# Patient Record
Sex: FEMALE | Race: BLACK OR AFRICAN AMERICAN | NOT HISPANIC OR LATINO | URBAN - METROPOLITAN AREA
[De-identification: names, ages, dates, MRNs, and addresses within clinical notes are randomized per-mention and may not be internally consistent; named-entity substitution may affect disease eponyms.]

---

## 2018-11-07 ENCOUNTER — APPOINTMENT (RX ONLY)
Dept: URBAN - METROPOLITAN AREA CLINIC 12 | Facility: CLINIC | Age: 63
Setting detail: DERMATOLOGY
End: 2018-11-07

## 2018-11-07 DIAGNOSIS — Z41.1 ENCOUNTER FOR COSMETIC SURGERY: ICD-10-CM

## 2018-11-07 PROBLEM — I82.409 ACUTE EMBOLISM AND THROMBOSIS OF UNSPECIFIED DEEP VEINS OF UNSPECIFIED LOWER EXTREMITY: Status: ACTIVE | Noted: 2018-11-07

## 2018-11-07 PROBLEM — Z92.3 PERSONAL HISTORY OF IRRADIATION: Status: ACTIVE | Noted: 2018-11-07

## 2018-11-07 PROBLEM — F32.9 MAJOR DEPRESSIVE DISORDER, SINGLE EPISODE, UNSPECIFIED: Status: ACTIVE | Noted: 2018-11-07

## 2018-11-07 PROBLEM — J45.909 UNSPECIFIED ASTHMA, UNCOMPLICATED: Status: ACTIVE | Noted: 2018-11-07

## 2018-11-07 PROBLEM — C49.5: Status: ACTIVE | Noted: 2018-11-07

## 2018-11-07 PROBLEM — I10 ESSENTIAL (PRIMARY) HYPERTENSION: Status: ACTIVE | Noted: 2018-11-07

## 2018-11-07 PROBLEM — K21.9 GASTRO-ESOPHAGEAL REFLUX DISEASE WITHOUT ESOPHAGITIS: Status: ACTIVE | Noted: 2018-11-07

## 2018-11-07 PROCEDURE — ? PATIENT SPECIFIC COUNSELING

## 2018-11-07 PROCEDURE — ? COUNSELING - DVT RISK ASSESSMENT

## 2018-11-07 PROCEDURE — ?

## 2018-11-07 PROCEDURE — 99203 OFFICE O/P NEW LOW 30 MIN: CPT

## 2018-11-07 PROCEDURE — ? PHOTOS OBTAINED

## 2018-11-07 PROCEDURE — ? PRE-OP WORKLIST

## 2018-11-07 PROCEDURE — ? COUNSELING EXCISION - MALIGNANT

## 2018-11-07 NOTE — PROCEDURE: COUNSELING EXCISION - MALIGNANT
Add Postop Global No-Charge Code (61012)?: no
Date Scheduled For Excision (Optional): TBD
X Size Of Lesion In Cm (Optional): 3.3
Anatomic Location From Referring Provider: Right lower anterior abdominal wall in the pelvis
Size Of Lesion: 4
Detail Level: Detailed
Other Immunosuppression Factors: Femara 2.5mg QD

## 2018-11-07 NOTE — HPI: SOFT TISSUE SARCOMA
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
Name Of Oncologist Doctor?: Dr. Jackson

## 2018-11-07 NOTE — PROCEDURE: PATIENT SPECIFIC COUNSELING
Other (Free Text): Dr. Diaz examined the patient’s abdomen and noted a scar from the patient’s previous . He noted there was no additional scar in that area, and the patient confirmed that the surgery from her previous sarcoma removal in  by Dr. Juan Pablo Reese at [facility] utilized her previous  scar. She denies any plastic surgeon involved in this surgery or any mesh placed within the surgical site as per her knowledge. 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 \\Brandon Diaz then examined the patient for a hernia, and reported a negative result. \\Brandon Diaz counseled the patient that if Dr. Jackson does not excise any muscle or fascia when removing her tumor, Dr. Diaz will utilize the folds on her abdomen to close the wound, similar to a abdominoplasty. He explained this scenario would require a 2-3 week recovery time. He then counseled the patient that if Dr. Jackson removes patient’s fascia and muscle, then the resulting surgical hernia would require a collagen sheet, created from de-epithelialized pig skin, followed by a mesh sheet on top for wound reconstruction. He informed the patient the collagen sheet is easily accepted by human bodies, and does not have the risk of attaching to the small bowel. He also explained placing the mesh on top of the collagen sheet reduces the risk of the mesh bonding to the bowels. Dr. Diaz counseled the patient this procedure tends to be very painful during the recovery period, so he will apply a long-lasting numbing agent during the surgery to help ease the discomfort in the first three days. He counseled the patient he cannot 100% predict how long the patient will be required to stay in the hospital for recovery, but most likely the patient will stay 2 nights. He explained that in order to reduce the risk of bleeding after surgery, he will wean the patient off her Pradaxa pills and begin her on the anti-coagulant Lovenox injections as a prophylactic for deep vein thrombosis. The patient verbalized understanding to all discussed.
Detail Level: Simple

## 2018-11-07 NOTE — PROCEDURE: PRE-OP WORKLIST
Date Of Evaluation: 11/06/2018
Surgeon: Dr. Kumar Diaz
Coordination With:: Dr. Jackson
Detail Level: Simple
Surgery Scheduled: Abdominal wall reconstruction with mesh and ADM
Photos Taken?: yes
Date Of Surgery: TBD

## 2018-11-07 NOTE — PROCEDURE: PHOTOS OBTAINED
Follow-Up For Additional Photos?: no
Detail Level: Detailed
Photographed Locations: Photos were obtained of the surgical site(s).

## 2018-11-09 VITALS — WEIGHT: 205 LBS | HEIGHT: 64 IN

## 2018-12-05 ENCOUNTER — APPOINTMENT (RX ONLY)
Dept: URBAN - METROPOLITAN AREA CLINIC 12 | Facility: CLINIC | Age: 63
Setting detail: DERMATOLOGY
End: 2018-12-05

## 2018-12-05 DIAGNOSIS — Z41.1 ENCOUNTER FOR COSMETIC SURGERY: ICD-10-CM

## 2018-12-05 PROBLEM — C49.5: Status: ACTIVE | Noted: 2018-12-05

## 2018-12-05 PROCEDURE — ? COUNSELING EXCISION - MALIGNANT

## 2018-12-05 PROCEDURE — ?

## 2018-12-05 PROCEDURE — ? COUNSELING - DVT RISK ASSESSMENT

## 2018-12-05 PROCEDURE — ? PATIENT SPECIFIC COUNSELING

## 2018-12-05 PROCEDURE — 99213 OFFICE O/P EST LOW 20 MIN: CPT

## 2018-12-05 NOTE — PROCEDURE: PATIENT SPECIFIC COUNSELING
Other (Free Text): Patient presents for a re talk regarding her surgery. Dr. Diaz explained that he talked to Dr. Jackson and he expressed that he will not have to go into the muscle portion of her abdomen, however , he will need to add mesh to fill up the missing facia. Dr. Diaz explained that she will be in a considerable amount of pain, as well as she will go home with drains. Dr. Diaz also explained that she will be in the hospital for at least 2-3 nights.  Dr. Diaz carefully explained that there is little to no affect with using the mesh. Dr. Diaz explained that the concern with using the mesh is very low. Dr. Diaz explained that he does use a very careful approach when placing the mesh, including an antibiotic wash before placing the mesh. Dr. Diaz explained that in the end, she will have inconsistencies in her contour. \\n \\Brandon Diaz explained that all of his work will be on the right lower quadrant of her abdomen. With no effort to the rest of her abdomen.  However Dr. Diaz explained that he can aesthetically make her abdomen look better by performing a panniculectomy. Which would involve excising the extra skin ( tire ) around her lower abdomen, close her up and later do liposuction to get rid of the fat n her upper abdomen. Dr. Diaz explained that he cannot safely perform the panniculctomy and liposuction together as it is not safe. Dr. Diaz expressed that insurance will not cover a tummy tuck or contouring of her abdomen as the insurance company will only cover what is medically necessary.  Dr. Diaz explained that if she does loose weight, after her surgery, she will have loose skin on her upper abdomen. Dr. Diaz explained that post surgery she will need to take it easy, no heavy lifting, and nothing that is going to raise her blood pressure and heart rate.\\nPatient verbalized understanding.
Detail Level: Zone

## 2018-12-12 ENCOUNTER — APPOINTMENT (RX ONLY)
Dept: URBAN - METROPOLITAN AREA CLINIC 12 | Facility: CLINIC | Age: 63
Setting detail: DERMATOLOGY
End: 2018-12-12

## 2018-12-12 DIAGNOSIS — Z41.1 ENCOUNTER FOR COSMETIC SURGERY: ICD-10-CM

## 2018-12-12 PROCEDURE — ? CONSULTATION - ABDOMINOPLASTY

## 2018-12-12 PROCEDURE — 99024 POSTOP FOLLOW-UP VISIT: CPT

## 2018-12-12 PROCEDURE — ? PRE-OP WORKLIST

## 2018-12-12 PROCEDURE — ? NO CHARGE PRE-OP VISIT

## 2018-12-12 ASSESSMENT — LOCATION ZONE DERM: LOCATION ZONE: TRUNK

## 2018-12-12 ASSESSMENT — LOCATION SIMPLE DESCRIPTION DERM: LOCATION SIMPLE: ABDOMEN

## 2018-12-12 ASSESSMENT — LOCATION DETAILED DESCRIPTION DERM: LOCATION DETAILED: PERIUMBILICAL SKIN

## 2018-12-12 NOTE — PROCEDURE: NO CHARGE PRE-OP VISIT
For Tracking Purposes Only. Note 90432 Is Retired Code. Use 54337 Or 32924?: [id number]
Detail Level: Detailed

## 2018-12-26 ENCOUNTER — APPOINTMENT (RX ONLY)
Dept: URBAN - METROPOLITAN AREA CLINIC 12 | Facility: CLINIC | Age: 63
Setting detail: DERMATOLOGY
End: 2018-12-26

## 2018-12-26 DIAGNOSIS — Z48.89 ENCOUNTER FOR OTHER SPECIFIED SURGICAL AFTERCARE: ICD-10-CM

## 2018-12-26 PROCEDURE — ? COUNSELING - POST-OP CHECK, PANNICULECTOMY

## 2018-12-26 PROCEDURE — ? COUNSELING - POST-OP CHECK, COMPLEX WOUND RECONSTRUCTION

## 2018-12-26 PROCEDURE — ? POST-OP CHECK

## 2018-12-26 PROCEDURE — 99024 POSTOP FOLLOW-UP VISIT: CPT

## 2018-12-26 PROCEDURE — ? PATIENT SPECIFIC COUNSELING

## 2018-12-26 ASSESSMENT — LOCATION DETAILED DESCRIPTION DERM
LOCATION DETAILED: RIGHT ANTERIOR PROXIMAL THIGH
LOCATION DETAILED: PERIUMBILICAL SKIN
LOCATION DETAILED: LEFT LATERAL ABDOMEN

## 2018-12-26 ASSESSMENT — LOCATION ZONE DERM
LOCATION ZONE: TRUNK
LOCATION ZONE: LEG

## 2018-12-26 ASSESSMENT — LOCATION SIMPLE DESCRIPTION DERM
LOCATION SIMPLE: ABDOMEN
LOCATION SIMPLE: RIGHT THIGH

## 2018-12-26 NOTE — PROCEDURE: PATIENT SPECIFIC COUNSELING
Detail Level: Simple
Other (Free Text): Dr. Diaz reassured the patient she is healing very well and has no unusual edema, hematoma, seroma, or sign of infection. He counseled her the reason her right abdominal side has more drainage than the left abdominal side is due to a small tear in the duodenum being created when Dr. Jackson removed her tumor. He explained Dr. Jackson sutured the hole shut, but the fluid from this area was released and thus is currently being drained out on her right abdominal side. Due to this, he educated the patient she should keep this right drain in for at least another week. He instructed the patient to listen to her body for the next week as far as how much pain medication she will need, and she is to cover both her right and left abdominal sides with gauze and brown paper tape, which she was furnished with, each day until she returns next week to have the drain on her right side removed. The patient verbalized understanding to all discussed and is to RTC in 1 week.

## 2018-12-26 NOTE — PROCEDURE: POST-OP CHECK
Detail Level: Detailed
Wound Evaluated By: Dr. Kumar Diaz
Add Postop Global No-Charge Code (08044)?: yes

## 2019-01-02 ENCOUNTER — APPOINTMENT (RX ONLY)
Dept: URBAN - METROPOLITAN AREA CLINIC 12 | Facility: CLINIC | Age: 64
Setting detail: DERMATOLOGY
End: 2019-01-02

## 2019-01-02 DIAGNOSIS — Z48.89 ENCOUNTER FOR OTHER SPECIFIED SURGICAL AFTERCARE: ICD-10-CM

## 2019-01-02 PROCEDURE — ? POST-OP CHECK

## 2019-01-02 PROCEDURE — ? SEPARATE AND IDENTIFIABLE DOCUMENTATION

## 2019-01-02 PROCEDURE — 99024 POSTOP FOLLOW-UP VISIT: CPT

## 2019-01-02 PROCEDURE — ? PATIENT SPECIFIC COUNSELING

## 2019-01-02 PROCEDURE — ? SET GLOBAL PERIOD

## 2019-01-02 PROCEDURE — ? COUNSELING - POST-OP CHECK, PANNICULECTOMY

## 2019-01-02 ASSESSMENT — LOCATION ZONE DERM
LOCATION ZONE: TRUNK
LOCATION ZONE: LEG

## 2019-01-02 ASSESSMENT — LOCATION SIMPLE DESCRIPTION DERM
LOCATION SIMPLE: RIGHT THIGH
LOCATION SIMPLE: ABDOMEN

## 2019-01-02 ASSESSMENT — LOCATION DETAILED DESCRIPTION DERM
LOCATION DETAILED: RIGHT ANTERIOR PROXIMAL THIGH
LOCATION DETAILED: PERIUMBILICAL SKIN

## 2019-01-02 NOTE — PROCEDURE: PATIENT SPECIFIC COUNSELING
Other (Free Text): Discussed with patient that her incisions are healing very well.\\nPatient will start taping incisions for 3 months.\\nExplained to patient that her drain output has not changed much since she’s had it in for 2 weeks. Discussed with patient that if the drain is left in too long, it can cause an infection. \\Nahidso explained to patient to start weening herself off the narcotics during the day, and only take it at night. A prescription for Tramadol was written today. \\nAdvised patient to take it easy if it’s causing pain.
Detail Level: Simple

## 2019-01-02 NOTE — PROCEDURE: POST-OP CHECK
Wound Evaluated By: Dr. Kumar Diaz
Detail Level: Detailed
Add Postop Global No-Charge Code (54810)?: yes

## 2019-01-08 ENCOUNTER — APPOINTMENT (RX ONLY)
Dept: URBAN - METROPOLITAN AREA CLINIC 12 | Facility: CLINIC | Age: 64
Setting detail: DERMATOLOGY
End: 2019-01-08

## 2019-01-08 DIAGNOSIS — Z48.89 ENCOUNTER FOR OTHER SPECIFIED SURGICAL AFTERCARE: ICD-10-CM

## 2019-01-08 PROCEDURE — ? SEPARATE AND IDENTIFIABLE DOCUMENTATION

## 2019-01-08 PROCEDURE — ? COUNSELING - POST-OP CHECK, PANNICULECTOMY

## 2019-01-08 PROCEDURE — ? SET GLOBAL PERIOD

## 2019-01-08 PROCEDURE — 99024 POSTOP FOLLOW-UP VISIT: CPT

## 2019-01-08 PROCEDURE — ? POST-OP CHECK

## 2019-01-08 PROCEDURE — ? PATIENT SPECIFIC COUNSELING

## 2019-01-08 PROCEDURE — ? PRESCRIPTION

## 2019-01-08 RX ORDER — METHOCARBAMOL 500 MG/1
TABLET, FILM COATED ORAL
Qty: 40 | Refills: 0 | Status: ACTIVE

## 2019-01-08 ASSESSMENT — LOCATION DETAILED DESCRIPTION DERM
LOCATION DETAILED: PERIUMBILICAL SKIN
LOCATION DETAILED: RIGHT ANTERIOR PROXIMAL THIGH

## 2019-01-08 ASSESSMENT — LOCATION SIMPLE DESCRIPTION DERM
LOCATION SIMPLE: ABDOMEN
LOCATION SIMPLE: RIGHT THIGH

## 2019-01-08 ASSESSMENT — LOCATION ZONE DERM
LOCATION ZONE: TRUNK
LOCATION ZONE: LEG

## 2019-01-08 NOTE — PROCEDURE: PATIENT SPECIFIC COUNSELING
Detail Level: Simple
Other (Free Text): Patient presents s/p abdominal reconstruction. Patient states she’s noticed significant swelling around her vaginal area. Dr. Diaz examined her incision sites and explained that her incisions look great and are healing well . Dr. Diaz addressed the swelling around her pelvic area. Patient was made aware that the swelling is normal and is due to the drilling holes into her pelvic bone to she down the mesh. Dr. Diaz explained that the amount  of discomfort she having is normal . Dr. Diaz informed patient that she should discontinue wearing the belly binder and start wearing spanks that focuses mainly on her lower abdomen to help elevate pain and diminish her swelling. Patient was advised to continue taping her incisions. And was informed that she may start driving as long as she not under the influence of narcotic medications. Patient was instructed to start moving her body around, however, she should listen to her body. Patient was given a prescription refill for Robaxin today. PT to follow up in 2 weeks

## 2019-01-08 NOTE — PROCEDURE: POST-OP CHECK
Add Postop Global No-Charge Code (30416)?: yes
Detail Level: Detailed
Wound Evaluated By: Dr. Kumar Diaz

## 2019-01-22 ENCOUNTER — APPOINTMENT (RX ONLY)
Dept: URBAN - METROPOLITAN AREA CLINIC 12 | Facility: CLINIC | Age: 64
Setting detail: DERMATOLOGY
End: 2019-01-22

## 2019-01-22 DIAGNOSIS — Z48.89 ENCOUNTER FOR OTHER SPECIFIED SURGICAL AFTERCARE: ICD-10-CM

## 2019-01-22 PROCEDURE — ? SEPARATE AND IDENTIFIABLE DOCUMENTATION

## 2019-01-22 PROCEDURE — ? PATIENT SPECIFIC COUNSELING

## 2019-01-22 PROCEDURE — ? COUNSELING - POST-OP CHECK, PANNICULECTOMY

## 2019-01-22 PROCEDURE — ? SET GLOBAL PERIOD

## 2019-01-22 PROCEDURE — ? POST-OP CHECK

## 2019-01-22 PROCEDURE — 99024 POSTOP FOLLOW-UP VISIT: CPT

## 2019-01-22 PROCEDURE — ? COUNSELING - POST-OP CHECK, COMPLEX WOUND RECONSTRUCTION

## 2019-01-22 ASSESSMENT — LOCATION SIMPLE DESCRIPTION DERM
LOCATION SIMPLE: RIGHT THIGH
LOCATION SIMPLE: ABDOMEN

## 2019-01-22 ASSESSMENT — LOCATION ZONE DERM
LOCATION ZONE: TRUNK
LOCATION ZONE: LEG

## 2019-01-22 ASSESSMENT — LOCATION DETAILED DESCRIPTION DERM
LOCATION DETAILED: PERIUMBILICAL SKIN
LOCATION DETAILED: RIGHT LATERAL ABDOMEN
LOCATION DETAILED: RIGHT ANTERIOR PROXIMAL THIGH

## 2019-01-22 NOTE — PROCEDURE: PATIENT SPECIFIC COUNSELING
Other (Free Text): Dr. Diaz reassured the patient her abdominal incisions are healing well with an expected amount of scar tissue and edema on the right side. He instructed the patient to continue taping her scars with the scar fading tape for at least 3 months, and she was furnished with another roll of tape. He also instructed her to wait to engage her deep core muscles when doing Pilates exercises, as her abdominal muscles still need some time to recover before strength training. The patient and Dr. Diaz agreed the patient should work with a physical therapist to stretch and perform low-grade core strength exercises, and Dr. Diaz wrote her a referral. The patient was reassured walking and exercising her upper body is okay at this time. The patient verbalized understanding to all discussed and she is to RTC following her CT scan next month, which Dr. Diaz informed her he will evaluate via the [facility] database.
Detail Level: Simple

## 2019-01-22 NOTE — PROCEDURE: SET GLOBAL PERIOD
Date Of Surgery (Mm/Dd/Yyyy): 12/18/2018
Detail Level: Simple
Surgery Global Period: 90
Surgery Global Period: 0
Other Surgery Performed: Panniculectomy

## 2019-01-22 NOTE — PROCEDURE: POST-OP CHECK
Add Postop Global No-Charge Code (99653)?: yes
Wound Evaluated By: Dr. Kumar Diaz
Detail Level: Detailed
Detail Level: Simple

## 2019-02-12 ENCOUNTER — APPOINTMENT (RX ONLY)
Dept: URBAN - METROPOLITAN AREA CLINIC 12 | Facility: CLINIC | Age: 64
Setting detail: DERMATOLOGY
End: 2019-02-12

## 2019-02-12 DIAGNOSIS — Z48.89 ENCOUNTER FOR OTHER SPECIFIED SURGICAL AFTERCARE: ICD-10-CM

## 2019-02-12 PROCEDURE — ? PATIENT SPECIFIC COUNSELING

## 2019-02-12 PROCEDURE — ? COUNSELING - POST-OP CHECK, COMPLEX WOUND RECONSTRUCTION

## 2019-02-12 PROCEDURE — 99024 POSTOP FOLLOW-UP VISIT: CPT

## 2019-02-12 PROCEDURE — ? COUNSELING - POST-OP CHECK, PANNICULECTOMY

## 2019-02-12 PROCEDURE — ? SEPARATE AND IDENTIFIABLE DOCUMENTATION

## 2019-02-12 PROCEDURE — ? POST-OP CHECK

## 2019-02-12 ASSESSMENT — LOCATION DETAILED DESCRIPTION DERM
LOCATION DETAILED: RIGHT LATERAL ABDOMEN
LOCATION DETAILED: RIGHT ANTERIOR PROXIMAL THIGH
LOCATION DETAILED: PERIUMBILICAL SKIN

## 2019-02-12 ASSESSMENT — LOCATION SIMPLE DESCRIPTION DERM
LOCATION SIMPLE: RIGHT THIGH
LOCATION SIMPLE: ABDOMEN

## 2019-02-12 ASSESSMENT — LOCATION ZONE DERM
LOCATION ZONE: TRUNK
LOCATION ZONE: LEG

## 2019-02-12 NOTE — PROCEDURE: PATIENT SPECIFIC COUNSELING
Other (Free Text): CT guided seroma aspiration recommended. They are likely seeing this on the CT scan. \\nPatient was going to start chemo on Friday. Dr. Diaz will call patient’s oncologist to discuss.\\n\\nPatient advised to follow up in a couple months when she is feeling good while doing chemo. Patient is expecting to be on 2 weeks and off 1 week.
Detail Level: Simple

## 2019-02-12 NOTE — PROCEDURE: POST-OP CHECK
Detail Level: Simple
Wound Evaluated By: Dr. Kumar Diaz
Add Postop Global No-Charge Code (22758)?: yes
Detail Level: Detailed

## 2019-04-09 ENCOUNTER — APPOINTMENT (RX ONLY)
Dept: URBAN - METROPOLITAN AREA CLINIC 12 | Facility: CLINIC | Age: 64
Setting detail: DERMATOLOGY
End: 2019-04-09

## 2019-04-09 DIAGNOSIS — Z48.89 ENCOUNTER FOR OTHER SPECIFIED SURGICAL AFTERCARE: ICD-10-CM

## 2019-04-09 PROCEDURE — ? POST-OP CHECK

## 2019-04-09 PROCEDURE — ? COUNSELING - POST-OP CHECK, COMPLEX WOUND RECONSTRUCTION

## 2019-04-09 PROCEDURE — ? SEPARATE AND IDENTIFIABLE DOCUMENTATION

## 2019-04-09 PROCEDURE — ? PATIENT SPECIFIC COUNSELING

## 2019-04-09 PROCEDURE — 99212 OFFICE O/P EST SF 10 MIN: CPT

## 2019-04-09 PROCEDURE — ? COUNSELING - POST-OP CHECK, PANNICULECTOMY

## 2019-04-09 ASSESSMENT — LOCATION ZONE DERM
LOCATION ZONE: TRUNK
LOCATION ZONE: LEG

## 2019-04-09 ASSESSMENT — LOCATION SIMPLE DESCRIPTION DERM
LOCATION SIMPLE: RIGHT THIGH
LOCATION SIMPLE: ABDOMEN

## 2019-04-09 NOTE — PROCEDURE: POST-OP CHECK
Add Postop Global No-Charge Code (35501)?: yes
Wound Evaluated By: Dr. Kumar Diaz
Detail Level: Simple
Detail Level: Detailed

## 2019-04-09 NOTE — PROCEDURE: PATIENT SPECIFIC COUNSELING
Detail Level: Simple
Other (Free Text): Patient presents for follow up abdominal wound reconstruction and panniculectomy. Patient states that she does feel a slight burning sensation around the end of her incision, Dr. Diaz examined her scar and abdomen and explained that everything looks great, Dr. Diaz explained that he does feel a little  tissue around her lower abdomen, no evidence of recurring mass. Dr. Diaz explained that , that she is free to start exercising, however she should listen to her body. Dr. Diaz advised that she return in 3 months for another follow up. Patient verbalized understanding.

## 2019-05-24 ENCOUNTER — APPOINTMENT (RX ONLY)
Dept: URBAN - METROPOLITAN AREA CLINIC 12 | Facility: CLINIC | Age: 64
Setting detail: DERMATOLOGY
End: 2019-05-24

## 2019-05-24 DIAGNOSIS — Z48.02 ENCOUNTER FOR REMOVAL OF SUTURES: ICD-10-CM

## 2019-05-24 PROCEDURE — ? COUNSELING - SUTURE REMOVAL

## 2019-05-24 PROCEDURE — ? SUTURE REMOVAL

## 2019-05-24 PROCEDURE — 99024 POSTOP FOLLOW-UP VISIT: CPT

## 2019-05-24 PROCEDURE — ? PATIENT SPECIFIC COUNSELING

## 2019-05-24 ASSESSMENT — LOCATION SIMPLE DESCRIPTION DERM: LOCATION SIMPLE: ABDOMEN

## 2019-05-24 ASSESSMENT — LOCATION ZONE DERM: LOCATION ZONE: TRUNK

## 2019-05-24 ASSESSMENT — LOCATION DETAILED DESCRIPTION DERM: LOCATION DETAILED: LEFT LATERAL ABDOMEN

## 2019-05-24 NOTE — PROCEDURE: PATIENT SPECIFIC COUNSELING
Other (Free Text): Patient presents for a remaining suture that was left in from her drain. Dr. Diaz clipped the suture and stated that everything looks great. Patient already has her next post op check scheduled and will RTC then
Detail Level: Simple

## 2019-07-22 ENCOUNTER — APPOINTMENT (RX ONLY)
Dept: URBAN - METROPOLITAN AREA CLINIC 12 | Facility: CLINIC | Age: 64
Setting detail: DERMATOLOGY
End: 2019-07-22

## 2019-07-22 DIAGNOSIS — Z48.89 ENCOUNTER FOR OTHER SPECIFIED SURGICAL AFTERCARE: ICD-10-CM

## 2019-07-22 PROCEDURE — ? PATIENT SPECIFIC COUNSELING

## 2019-07-22 PROCEDURE — ? POST-OP CHECK

## 2019-07-22 PROCEDURE — ? COUNSELING - POST-OP CHECK, PANNICULECTOMY

## 2019-07-22 PROCEDURE — ? COUNSELING - POST-OP CHECK, COMPLEX WOUND RECONSTRUCTION

## 2019-07-22 PROCEDURE — 99212 OFFICE O/P EST SF 10 MIN: CPT

## 2019-07-22 PROCEDURE — ? SEPARATE AND IDENTIFIABLE DOCUMENTATION

## 2019-07-22 ASSESSMENT — LOCATION ZONE DERM
LOCATION ZONE: TRUNK
LOCATION ZONE: LEG

## 2019-07-22 ASSESSMENT — LOCATION DETAILED DESCRIPTION DERM
LOCATION DETAILED: RIGHT ANTERIOR PROXIMAL THIGH
LOCATION DETAILED: PERIUMBILICAL SKIN
LOCATION DETAILED: RIGHT LATERAL ABDOMEN

## 2019-07-22 ASSESSMENT — LOCATION SIMPLE DESCRIPTION DERM
LOCATION SIMPLE: RIGHT THIGH
LOCATION SIMPLE: ABDOMEN

## 2019-07-22 NOTE — PROCEDURE: POST-OP CHECK
Wound Evaluated By: Dr. Kumar Diaz
Detail Level: Simple
Add Postop Global No-Charge Code (29183)?: yes
Detail Level: Detailed

## 2019-07-22 NOTE — PROCEDURE: PATIENT SPECIFIC COUNSELING
Detail Level: Simple
Other (Free Text): Patient presents for follow up abdominal wound reconstruction and panniculectomy. Patient states that she still has some pain in her abdomen. Dr. Diaz examined and stated that she has healed very well. The patient pointed out a small lump that she feels. Dr. Diaz explained that this could be some scar tissue. Patient has a CT scan tomorrow and this will be able to tell whether it is a reoccurrence. Patient should make sure Dr. Diaz receives a copy of results.
